# Patient Record
Sex: FEMALE | Race: WHITE | HISPANIC OR LATINO | Employment: STUDENT | URBAN - METROPOLITAN AREA
[De-identification: names, ages, dates, MRNs, and addresses within clinical notes are randomized per-mention and may not be internally consistent; named-entity substitution may affect disease eponyms.]

---
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
## 2024-10-29 ENCOUNTER — HOSPITAL ENCOUNTER (EMERGENCY)
Facility: HOSPITAL | Age: 19
Discharge: HOME | End: 2024-10-29
Attending: EMERGENCY MEDICINE
Payer: MEDICARE

## 2024-10-29 ENCOUNTER — APPOINTMENT (EMERGENCY)
Dept: CT IMAGING | Facility: HOSPITAL | Age: 19
End: 2024-10-29
Payer: MEDICARE

## 2024-10-29 VITALS
RESPIRATION RATE: 20 BRPM | DIASTOLIC BLOOD PRESSURE: 73 MMHG | OXYGEN SATURATION: 99 % | TEMPERATURE: 99 F | SYSTOLIC BLOOD PRESSURE: 132 MMHG | HEART RATE: 95 BPM

## 2024-10-29 DIAGNOSIS — S09.90XA CLOSED HEAD INJURY, INITIAL ENCOUNTER: Primary | ICD-10-CM

## 2024-10-29 DIAGNOSIS — S00.12XA BLACK EYE OF LEFT SIDE, INITIAL ENCOUNTER: ICD-10-CM

## 2024-10-29 DIAGNOSIS — T07.XXXA ABRASIONS OF MULTIPLE SITES: ICD-10-CM

## 2024-10-29 PROCEDURE — 90715 TDAP VACCINE 7 YRS/> IM: CPT | Performed by: EMERGENCY MEDICINE

## 2024-10-29 PROCEDURE — 70450 CT HEAD/BRAIN W/O DYE: CPT

## 2024-10-29 PROCEDURE — 70486 CT MAXILLOFACIAL W/O DYE: CPT

## 2024-10-29 PROCEDURE — 99284 EMERGENCY DEPT VISIT MOD MDM: CPT

## 2024-10-29 PROCEDURE — 90471 IMMUNIZATION ADMIN: CPT

## 2024-10-29 PROCEDURE — 99284 EMERGENCY DEPT VISIT MOD MDM: CPT | Performed by: EMERGENCY MEDICINE

## 2024-10-29 RX ORDER — METOCLOPRAMIDE 10 MG/1
10 TABLET ORAL ONCE
Status: COMPLETED | OUTPATIENT
Start: 2024-10-29 | End: 2024-10-29

## 2024-10-29 RX ORDER — ACETAMINOPHEN 325 MG/1
TABLET ORAL
Status: DISCONTINUED
Start: 2024-10-29 | End: 2024-10-29 | Stop reason: HOSPADM

## 2024-10-29 RX ORDER — ACETAMINOPHEN 325 MG/1
650 TABLET ORAL ONCE
Status: COMPLETED | OUTPATIENT
Start: 2024-10-29 | End: 2024-10-29

## 2024-10-29 RX ORDER — METOCLOPRAMIDE 10 MG/1
TABLET ORAL
Status: DISCONTINUED
Start: 2024-10-29 | End: 2024-10-29 | Stop reason: HOSPADM

## 2024-10-29 RX ADMIN — ACETAMINOPHEN 650 MG: 325 TABLET, FILM COATED ORAL at 14:55

## 2024-10-29 RX ADMIN — TETANUS TOXOID, REDUCED DIPHTHERIA TOXOID AND ACELLULAR PERTUSSIS VACCINE, ADSORBED 0.5 ML: 5; 2.5; 8; 8; 2.5 SUSPENSION INTRAMUSCULAR at 14:55

## 2024-10-29 RX ADMIN — METOCLOPRAMIDE 10 MG: 10 TABLET ORAL at 14:55
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
## 2025-01-22 ENCOUNTER — HOSPITAL ENCOUNTER (EMERGENCY)
Facility: HOSPITAL | Age: 20
Discharge: HOME | End: 2025-01-22
Attending: INTERNAL MEDICINE
Payer: MEDICARE

## 2025-01-22 VITALS
HEART RATE: 115 BPM | WEIGHT: 95.9 LBS | DIASTOLIC BLOOD PRESSURE: 74 MMHG | OXYGEN SATURATION: 99 % | SYSTOLIC BLOOD PRESSURE: 115 MMHG | TEMPERATURE: 99 F | RESPIRATION RATE: 18 BRPM

## 2025-01-22 DIAGNOSIS — N75.1 BARTHOLIN'S GLAND ABSCESS: Primary | ICD-10-CM

## 2025-01-22 LAB
EXT PREGNANCY TEST URINE: NEGATIVE
EXT. CONTROL: NORMAL

## 2025-01-22 PROCEDURE — 81025 URINE PREGNANCY TEST: CPT

## 2025-01-22 PROCEDURE — 99283 EMERGENCY DEPT VISIT LOW MDM: CPT

## 2025-01-22 PROCEDURE — 99284 EMERGENCY DEPT VISIT MOD MDM: CPT

## 2025-01-22 PROCEDURE — 96372 THER/PROPH/DIAG INJ SC/IM: CPT

## 2025-01-22 RX ORDER — ACETAMINOPHEN 325 MG/1
975 TABLET ORAL ONCE
Status: COMPLETED | OUTPATIENT
Start: 2025-01-22 | End: 2025-01-22

## 2025-01-22 RX ORDER — SULFAMETHOXAZOLE AND TRIMETHOPRIM 800; 160 MG/1; MG/1
1 TABLET ORAL 2 TIMES DAILY
Qty: 14 TABLET | Refills: 0 | Status: SHIPPED | OUTPATIENT
Start: 2025-01-22 | End: 2025-01-29

## 2025-01-22 RX ORDER — KETOROLAC TROMETHAMINE 30 MG/ML
15 INJECTION, SOLUTION INTRAMUSCULAR; INTRAVENOUS ONCE
Status: COMPLETED | OUTPATIENT
Start: 2025-01-22 | End: 2025-01-22

## 2025-01-22 RX ORDER — IBUPROFEN 600 MG/1
600 TABLET, FILM COATED ORAL EVERY 6 HOURS PRN
Qty: 30 TABLET | Refills: 0 | Status: SHIPPED | OUTPATIENT
Start: 2025-01-22

## 2025-01-22 RX ORDER — LIDOCAINE HYDROCHLORIDE 10 MG/ML
10 INJECTION, SOLUTION EPIDURAL; INFILTRATION; INTRACAUDAL; PERINEURAL ONCE
Status: DISCONTINUED | OUTPATIENT
Start: 2025-01-22 | End: 2025-01-22 | Stop reason: HOSPADM

## 2025-01-22 RX ADMIN — KETOROLAC TROMETHAMINE 15 MG: 30 INJECTION, SOLUTION INTRAMUSCULAR; INTRAVENOUS at 08:55

## 2025-01-22 RX ADMIN — ACETAMINOPHEN 975 MG: 325 TABLET, FILM COATED ORAL at 09:01

## 2025-01-22 NOTE — DISCHARGE INSTRUCTIONS
Follow up with OBGYN, see appointment. Return to any ED sooner for continued, new or worsening symptoms as discussed. Or if you change you mind and would like I&D.

## 2025-01-22 NOTE — ED PROVIDER NOTES
"Time reflects when diagnosis was documented in both MDM as applicable and the Disposition within this note       Time User Action Codes Description Comment    1/22/2025  8:25 AM Henson, Asmita Bakari [N75.1] Bartholin's gland abscess           ED Disposition       ED Disposition   Discharge    Condition   Stable    Date/Time   Wed Jan 22, 2025  9:42 AM    Comment   [facility] discharge to home/self care.                   Assessment & Plan       Medical Decision Making  Bartholin gland abscess noted on exam. Negative urine hcg. Toradol, tylenol for pain. Patient unable to be scheduled with OB/GYN until Tuesday.  I&D of bartholin gland abscess recommended today, patient refused-was able to verbalize understanding of risks and benefits. See ED course. Abx prescribed, strict return precautions, obgyn follow up.     All imaging and/or lab testing discussed with patient, strict return to ED precautions discussed. Patient recommended to follow up promptly with appropriate outpatient provider and risk of morbidity/mortality if patient does not follow up as recommended was discussed. Patient and/or family members verbalizes understanding. Patient and/or family members were given opportunity to ask questions, all questions were answered at this time.      Portions of the record may have been created with voice recognition software. Occasional wrong word or \"sound a like\" substitutions may have occurred due to the inherent limitations of voice recognition software. Read the chart carefully and recognize, using context, where substitutions have occurred.       Amount and/or Complexity of Data Reviewed  Labs: ordered.    Risk  OTC drugs.  Prescription drug management.        ED Course as of 01/22/25 2030 Wed Jan 22, 2025   0937 Patient refused I&D. AAOx4. Ambulating with steady gait. Answering questions appropriately. Discussed benefits of I&D and risks of refusing. She voiced understanding and still refuses at thsi time. " "Treatment refusal signed. Has obgyn follow up. Discussed strict return precautions.        Medications   acetaminophen (TYLENOL) tablet 975 mg (975 mg Oral Given 1/22/25 0901)   ketorolac (TORADOL) injection 15 mg (15 mg Intramuscular Given 1/22/25 0855)       ED Risk Strat Scores                                              History of Present Illness       Chief Complaint   Patient presents with    Abscess     Abscess \"in between my vagina lips\" x 1 week  was using warm compresses at home.       Past Medical History:   Diagnosis Date    ADHD (attention deficit hyperactivity disorder)     Depression     no meds since 2021      Past Surgical History:   Procedure Laterality Date    NO PAST SURGERIES        Family History   Problem Relation Age of Onset    No Known Problems Mother     No Known Problems Father     Breast cancer Neg Hx     Colon cancer Neg Hx     Cancer Neg Hx     Ovarian cancer Neg Hx       Social History     Tobacco Use    Smoking status: Never    Smokeless tobacco: Never    Tobacco comments:     mom vaping   Vaping Use    Vaping status: Every Day    Substances: Nicotine, Flavoring   Substance Use Topics    Alcohol use: Never    Drug use: Yes     Types: Marijuana     Comment: couple times/week      E-Cigarette/Vaping    E-Cigarette Use Current Every Day User       E-Cigarette/Vaping Substances    Nicotine Yes     Flavoring Yes       I have reviewed and agree with the history as documented.     19-year-old female with past medical history of Bartholin gland cyst, ADHD, depression presents emergency department planing of painful vaginal abscess for the past 2 days.  Has happened before patient reports.  Has been using warm compresses.  No draining.  No worsening pain.  Denies any known fevers at home.  Did not take anything for the pain.  Denies vaginal discharge, dysuria, hematuria, urinary urgency, urinary frequency, vaginal bleeding, vomiting, pelvic pain,.      History provided by:  " Patient  Abscess  Abscess quality: painful    Associated symptoms: no anorexia, no fatigue, no headaches, no nausea and no vomiting        Review of Systems   Constitutional:  Negative for chills and fatigue.   Gastrointestinal:  Negative for anorexia, nausea and vomiting.   Genitourinary:  Positive for vaginal pain. Negative for decreased urine volume, difficulty urinating, dysuria, flank pain, frequency, hematuria, pelvic pain, vaginal bleeding and vaginal discharge.   Musculoskeletal:  Negative for back pain.   Neurological:  Negative for headaches.   All other systems reviewed and are negative.          Objective       ED Triage Vitals [01/22/25 0753]   Temperature Pulse Blood Pressure Respirations SpO2 Patient Position - Orthostatic VS   100.4 °F (38 °C) (!) 121 115/74 18 99 % Lying      Temp Source Heart Rate Source BP Location FiO2 (%) Pain Score    Oral Monitor Left arm -- --      Vitals      Date and Time Temp Pulse SpO2 Resp BP Pain Score FACES Pain Rating User   01/22/25 0852 99 °F (37.2 °C) 115 -- -- -- -- -- SN   01/22/25 0753 100.4 °F (38 °C) 121 99 % 18 115/74 -- -- JW            Physical Exam  Vitals and nursing note reviewed. Exam conducted with a chaperone present (JOÃO Bush).   Constitutional:       General: She is awake. Distressed: uncomfortable with movement that involves genital region, grimacing.      Appearance: Normal appearance. She is not ill-appearing, toxic-appearing or diaphoretic.   HENT:      Head: Normocephalic.      Mouth/Throat:      Lips: Pink.      Mouth: Mucous membranes are moist.   Eyes:      General: Vision grossly intact.   Cardiovascular:      Rate and Rhythm: Regular rhythm. Tachycardia present.      Heart sounds: Normal heart sounds.      Comments: In pain  Pulmonary:      Effort: Pulmonary effort is normal. No respiratory distress.      Breath sounds: Normal breath sounds.   Abdominal:      General: There is no distension.      Palpations: Abdomen is soft.       Tenderness: There is no abdominal tenderness.   Genitourinary:     Labia:         Right: Tenderness (swelling, induration of bartholin gland, very tender) present.       Vagina: No vaginal discharge or bleeding.      Comments: No streaking. No crepitus.   Skin:     General: Skin is warm and dry.   Neurological:      Mental Status: She is alert and oriented to person, place, and time.      Gait: Gait normal.         Results Reviewed       Procedure Component Value Units Date/Time    POCT pregnancy, urine [[id number]]  (Normal) Collected: 01/22/25 0850    Lab Status: Final result Updated: 01/22/25 0851     EXT Preg Test, Ur Negative     Control Valid            No orders to display       Procedures    ED Medication and Procedure Management   Prior to Admission Medications   Prescriptions Last Dose Informant Patient Reported? Taking?   escitalopram (Lexapro) 10 mg tablet   No No   Sig: Take 1 tablet (10 mg total) by mouth daily   Patient not taking: Reported on 1/9/2023      Facility-Administered Medications: None     Discharge Medication List as of 1/22/2025  9:42 AM        START taking these medications    Details   amoxicillin-clavulanate (AUGMENTIN) 875-125 mg per tablet Take 1 tablet by mouth every 12 (twelve) hours for 7 days, Starting Wed 1/22/2025, Until Wed 1/29/2025, Normal      sulfamethoxazole-trimethoprim (BACTRIM DS) 800-160 mg per tablet Take 1 tablet by mouth 2 (two) times a day for 7 days smx-tmp DS (BACTRIM) 800-160 mg tabs (1tab q12 D10), Starting Wed 1/22/2025, Until Wed 1/29/2025, Normal           CONTINUE these medications which have NOT CHANGED    Details   escitalopram (Lexapro) 10 mg tablet Take 1 tablet (10 mg total) by mouth daily, Starting Wed 10/5/2022, Normal             ED SEPSIS DOCUMENTATION   Time reflects when diagnosis was documented in both MDM as applicable and the Disposition within this note       Time User Action Codes Description Comment    1/22/2025  8:25 AM Asmita Henson Add  [N75.1] Bartholin's gland abscess                  Asmita Henson PA-C  01/22/25 2030

## 2025-01-27 PROBLEM — N75.0 BARTHOLIN GLAND CYST: Status: ACTIVE | Noted: 2025-01-27

## 2025-01-28 ENCOUNTER — TELEPHONE (OUTPATIENT)
Dept: OBGYN CLINIC | Facility: CLINIC | Age: 20
End: 2025-01-28